# Patient Record
Sex: FEMALE | Race: WHITE | NOT HISPANIC OR LATINO
[De-identification: names, ages, dates, MRNs, and addresses within clinical notes are randomized per-mention and may not be internally consistent; named-entity substitution may affect disease eponyms.]

---

## 2018-11-14 PROBLEM — Z00.00 ENCOUNTER FOR PREVENTIVE HEALTH EXAMINATION: Status: ACTIVE | Noted: 2018-11-14

## 2018-12-05 ENCOUNTER — APPOINTMENT (OUTPATIENT)
Dept: BARIATRICS | Facility: CLINIC | Age: 28
End: 2018-12-05
Payer: COMMERCIAL

## 2018-12-05 VITALS
OXYGEN SATURATION: 99 % | WEIGHT: 264 LBS | TEMPERATURE: 98.2 F | DIASTOLIC BLOOD PRESSURE: 83 MMHG | HEART RATE: 89 BPM | SYSTOLIC BLOOD PRESSURE: 138 MMHG | BODY MASS INDEX: 51.83 KG/M2 | HEIGHT: 60 IN

## 2018-12-05 PROCEDURE — 99203 OFFICE O/P NEW LOW 30 MIN: CPT

## 2019-01-03 ENCOUNTER — APPOINTMENT (OUTPATIENT)
Dept: BARIATRICS | Facility: CLINIC | Age: 29
End: 2019-01-03

## 2019-01-03 ENCOUNTER — APPOINTMENT (OUTPATIENT)
Dept: BARIATRICS | Facility: CLINIC | Age: 29
End: 2019-01-03
Payer: COMMERCIAL

## 2019-01-03 VITALS — HEIGHT: 60 IN | WEIGHT: 260.38 LBS | BODY MASS INDEX: 51.12 KG/M2

## 2019-01-03 DIAGNOSIS — E28.2 POLYCYSTIC OVARIAN SYNDROME: ICD-10-CM

## 2019-01-03 DIAGNOSIS — E66.01 MORBID (SEVERE) OBESITY DUE TO EXCESS CALORIES: ICD-10-CM

## 2019-01-03 PROCEDURE — 97802 MEDICAL NUTRITION INDIV IN: CPT

## 2019-01-07 PROBLEM — E66.01 MORBID OBESITY DUE TO EXCESS CALORIES: Status: ACTIVE | Noted: 2018-12-05

## 2019-01-07 PROBLEM — E28.2 PCOS (POLYCYSTIC OVARIAN SYNDROME): Status: ACTIVE | Noted: 2018-12-05

## 2019-04-11 ENCOUNTER — OTHER (OUTPATIENT)
Age: 29
End: 2019-04-11

## 2019-04-24 ENCOUNTER — APPOINTMENT (OUTPATIENT)
Dept: BARIATRICS | Facility: CLINIC | Age: 29
End: 2019-04-24
Payer: COMMERCIAL

## 2019-04-24 ENCOUNTER — TRANSCRIPTION ENCOUNTER (OUTPATIENT)
Age: 29
End: 2019-04-24

## 2019-04-24 VITALS
SYSTOLIC BLOOD PRESSURE: 140 MMHG | BODY MASS INDEX: 50.92 KG/M2 | DIASTOLIC BLOOD PRESSURE: 86 MMHG | HEIGHT: 60 IN | HEART RATE: 65 BPM | WEIGHT: 259.38 LBS | OXYGEN SATURATION: 96 % | TEMPERATURE: 96.6 F

## 2019-04-24 PROCEDURE — 99213 OFFICE O/P EST LOW 20 MIN: CPT

## 2019-04-24 NOTE — PHYSICAL EXAM
[Obese, well nourished, in no acute distress] : obese, well nourished, in no acute distress [Normal] : affect appropriate [de-identified] : i [de-identified] : mmm, good color

## 2019-04-24 NOTE — HISTORY OF PRESENT ILLNESS
[de-identified] : 30 yo female for final visit for lap vsg.  she is feelign well today.  nutrition quiz taken. has lost weight .  no change in med hx. Denies fever, chills, n/v, cp, sob, abd pain, heartburn, diarrhea, constipation, lightheadedness, dizziness, calf pain, HA , hx of thrombosis.  denies nsaids

## 2019-04-24 NOTE — ASSESSMENT
[FreeTextEntry1] : Patient understands the need for early ambulation to prevent thrombosis as well as the need for adequate hydration.  Nutrition quiz reviewed today, understands periop instructions as well as the risks and benefits of the surgery.

## 2019-04-24 NOTE — REVIEW OF SYSTEMS
[Recent Change In Weight] : ~T recent weight change [Negative] : Psychiatric [Fever] : no fever [Chills] : no chills

## 2019-04-25 VITALS
HEART RATE: 67 BPM | SYSTOLIC BLOOD PRESSURE: 129 MMHG | DIASTOLIC BLOOD PRESSURE: 60 MMHG | OXYGEN SATURATION: 98 % | HEIGHT: 61 IN | RESPIRATION RATE: 18 BRPM | WEIGHT: 255.3 LBS | TEMPERATURE: 98 F

## 2019-04-29 ENCOUNTER — RESULT REVIEW (OUTPATIENT)
Age: 29
End: 2019-04-29

## 2019-04-29 ENCOUNTER — INPATIENT (INPATIENT)
Facility: HOSPITAL | Age: 29
LOS: 1 days | Discharge: HOME | DRG: 621 | End: 2019-05-01
Attending: SURGERY | Admitting: SURGERY
Payer: COMMERCIAL

## 2019-04-29 ENCOUNTER — APPOINTMENT (OUTPATIENT)
Dept: BARIATRICS | Facility: HOSPITAL | Age: 29
End: 2019-04-29

## 2019-04-29 LAB
BLD GP AB SCN SERPL QL: NEGATIVE — SIGNIFICANT CHANGE UP
HCT VFR BLD CALC: 42.5 % — SIGNIFICANT CHANGE UP (ref 34.5–45)
HGB BLD-MCNC: 13.6 G/DL — SIGNIFICANT CHANGE UP (ref 11.5–15.5)
MCHC RBC-ENTMCNC: 25.3 PG — LOW (ref 27–34)
MCHC RBC-ENTMCNC: 32 GM/DL — SIGNIFICANT CHANGE UP (ref 32–36)
MCV RBC AUTO: 79.1 FL — LOW (ref 80–100)
NRBC # BLD: 0 /100 WBCS — SIGNIFICANT CHANGE UP (ref 0–0)
PLATELET # BLD AUTO: 202 K/UL — SIGNIFICANT CHANGE UP (ref 150–400)
RBC # BLD: 5.37 M/UL — HIGH (ref 3.8–5.2)
RBC # FLD: 14.5 % — SIGNIFICANT CHANGE UP (ref 10.3–14.5)
RH IG SCN BLD-IMP: NEGATIVE — SIGNIFICANT CHANGE UP
WBC # BLD: 6.73 K/UL — SIGNIFICANT CHANGE UP (ref 3.8–10.5)
WBC # FLD AUTO: 6.73 K/UL — SIGNIFICANT CHANGE UP (ref 3.8–10.5)

## 2019-04-29 PROCEDURE — 43775 LAP SLEEVE GASTRECTOMY: CPT

## 2019-04-29 RX ORDER — ACETAMINOPHEN 500 MG
1000 TABLET ORAL ONCE
Qty: 0 | Refills: 0 | Status: DISCONTINUED | OUTPATIENT
Start: 2019-04-29 | End: 2019-04-29

## 2019-04-29 RX ORDER — GABAPENTIN 400 MG/1
300 CAPSULE ORAL ONCE
Qty: 0 | Refills: 0 | Status: COMPLETED | OUTPATIENT
Start: 2019-04-29 | End: 2019-04-29

## 2019-04-29 RX ORDER — CEFAZOLIN SODIUM 1 G
2000 VIAL (EA) INJECTION EVERY 8 HOURS
Qty: 0 | Refills: 0 | Status: COMPLETED | OUTPATIENT
Start: 2019-04-29 | End: 2019-04-30

## 2019-04-29 RX ORDER — METHIMAZOLE 10 MG/1
0 TABLET ORAL
Qty: 0 | Refills: 0 | COMMUNITY

## 2019-04-29 RX ORDER — KETOROLAC TROMETHAMINE 30 MG/ML
30 SYRINGE (ML) INJECTION ONCE
Qty: 0 | Refills: 0 | Status: DISCONTINUED | OUTPATIENT
Start: 2019-04-29 | End: 2019-04-29

## 2019-04-29 RX ORDER — HYDROMORPHONE HYDROCHLORIDE 2 MG/ML
0.5 INJECTION INTRAMUSCULAR; INTRAVENOUS; SUBCUTANEOUS ONCE
Qty: 0 | Refills: 0 | Status: DISCONTINUED | OUTPATIENT
Start: 2019-04-29 | End: 2019-04-29

## 2019-04-29 RX ORDER — ACETAMINOPHEN 500 MG
1000 TABLET ORAL ONCE
Qty: 0 | Refills: 0 | Status: COMPLETED | OUTPATIENT
Start: 2019-04-29 | End: 2019-04-29

## 2019-04-29 RX ORDER — CEFAZOLIN SODIUM 1 G
2000 VIAL (EA) INJECTION EVERY 8 HOURS
Qty: 0 | Refills: 0 | Status: DISCONTINUED | OUTPATIENT
Start: 2019-04-29 | End: 2019-04-29

## 2019-04-29 RX ORDER — PANTOPRAZOLE SODIUM 20 MG/1
40 TABLET, DELAYED RELEASE ORAL DAILY
Qty: 0 | Refills: 0 | Status: DISCONTINUED | OUTPATIENT
Start: 2019-04-29 | End: 2019-05-01

## 2019-04-29 RX ORDER — HYDROMORPHONE HYDROCHLORIDE 2 MG/ML
0.5 INJECTION INTRAMUSCULAR; INTRAVENOUS; SUBCUTANEOUS EVERY 6 HOURS
Qty: 0 | Refills: 0 | Status: DISCONTINUED | OUTPATIENT
Start: 2019-04-29 | End: 2019-04-30

## 2019-04-29 RX ORDER — ENOXAPARIN SODIUM 100 MG/ML
40 INJECTION SUBCUTANEOUS ONCE
Qty: 0 | Refills: 0 | Status: COMPLETED | OUTPATIENT
Start: 2019-04-29 | End: 2019-04-29

## 2019-04-29 RX ORDER — SCOPALAMINE 1 MG/3D
1 PATCH, EXTENDED RELEASE TRANSDERMAL ONCE
Qty: 0 | Refills: 0 | Status: COMPLETED | OUTPATIENT
Start: 2019-04-29 | End: 2019-04-29

## 2019-04-29 RX ORDER — SODIUM CHLORIDE 9 MG/ML
1000 INJECTION, SOLUTION INTRAVENOUS
Qty: 0 | Refills: 0 | Status: DISCONTINUED | OUTPATIENT
Start: 2019-04-29 | End: 2019-04-30

## 2019-04-29 RX ORDER — ACETAMINOPHEN 500 MG
650 TABLET ORAL EVERY 6 HOURS
Qty: 0 | Refills: 0 | Status: DISCONTINUED | OUTPATIENT
Start: 2019-04-29 | End: 2019-04-30

## 2019-04-29 RX ADMIN — HYDROMORPHONE HYDROCHLORIDE 0.5 MILLIGRAM(S): 2 INJECTION INTRAMUSCULAR; INTRAVENOUS; SUBCUTANEOUS at 16:02

## 2019-04-29 RX ADMIN — GABAPENTIN 300 MILLIGRAM(S): 400 CAPSULE ORAL at 10:01

## 2019-04-29 RX ADMIN — SODIUM CHLORIDE 150 MILLILITER(S): 9 INJECTION, SOLUTION INTRAVENOUS at 17:48

## 2019-04-29 RX ADMIN — HYDROMORPHONE HYDROCHLORIDE 0.5 MILLIGRAM(S): 2 INJECTION INTRAMUSCULAR; INTRAVENOUS; SUBCUTANEOUS at 21:50

## 2019-04-29 RX ADMIN — PANTOPRAZOLE SODIUM 40 MILLIGRAM(S): 20 TABLET, DELAYED RELEASE ORAL at 16:02

## 2019-04-29 RX ADMIN — HYDROMORPHONE HYDROCHLORIDE 0.5 MILLIGRAM(S): 2 INJECTION INTRAMUSCULAR; INTRAVENOUS; SUBCUTANEOUS at 16:15

## 2019-04-29 RX ADMIN — Medication 1000 MILLIGRAM(S): at 10:00

## 2019-04-29 RX ADMIN — SCOPALAMINE 1 PATCH: 1 PATCH, EXTENDED RELEASE TRANSDERMAL at 10:02

## 2019-04-29 RX ADMIN — Medication 2000 MILLIGRAM(S): at 21:19

## 2019-04-29 RX ADMIN — ENOXAPARIN SODIUM 40 MILLIGRAM(S): 100 INJECTION SUBCUTANEOUS at 10:01

## 2019-04-29 RX ADMIN — Medication 650 MILLIGRAM(S): at 19:47

## 2019-04-29 RX ADMIN — HYDROMORPHONE HYDROCHLORIDE 0.5 MILLIGRAM(S): 2 INJECTION INTRAMUSCULAR; INTRAVENOUS; SUBCUTANEOUS at 21:19

## 2019-04-29 NOTE — PROGRESS NOTE ADULT - SUBJECTIVE AND OBJECTIVE BOX
Team 2 Surgery Post-Op Note    Procedure: Laparoscopic sleeve gastrectomy      Subjective: Patient endorses mild nausea and incisional pain    Vital Signs Last 24 Hrs  T(C): 36.7 (29 Apr 2019 14:52), Max: 36.7 (29 Apr 2019 14:52)  T(F): 98 (29 Apr 2019 14:52), Max: 98 (29 Apr 2019 14:52)  HR: 52 (29 Apr 2019 17:21) (50 - 82)  BP: 154/73 (29 Apr 2019 17:21) (142/66 - 187/87)  BP(mean): 108 (29 Apr 2019 17:00) (89 - 118)  RR: 18 (29 Apr 2019 17:21) (16 - 19)  SpO2: 100% (29 Apr 2019 17:21) (99% - 100%)    Physical Exam:  General: NAD, resting comfortably in bed  Pulmonary: Nonlabored breathing, no respiratory distress  Cardiovascular: No heaves or thrills  Abdominal: Soft, obese, nondistended, appropriately tender, incisions CDI  Extremities: WWP, no cyanosis  Neuro: A/O x 3      LABS:            CAPILLARY BLOOD GLUCOSE            ABO Interpretation: O (04-29 @ 09:51)          Assessment:29y Female s/p above procedure    Plan:  Pain/nausea control IV PRN  Home meds  Incentive spirometer/OOB/Ambulate  BCLD/IVF  Postop labs in 4 hours  TOV in 8 hours  AM labs

## 2019-04-30 ENCOUNTER — TRANSCRIPTION ENCOUNTER (OUTPATIENT)
Age: 29
End: 2019-04-30

## 2019-04-30 LAB
ANION GAP SERPL CALC-SCNC: 14 MMOL/L — SIGNIFICANT CHANGE UP (ref 5–17)
BUN SERPL-MCNC: 10 MG/DL — SIGNIFICANT CHANGE UP (ref 7–23)
CALCIUM SERPL-MCNC: 9.2 MG/DL — SIGNIFICANT CHANGE UP (ref 8.4–10.5)
CHLORIDE SERPL-SCNC: 105 MMOL/L — SIGNIFICANT CHANGE UP (ref 96–108)
CO2 SERPL-SCNC: 17 MMOL/L — LOW (ref 22–31)
CREAT SERPL-MCNC: 0.66 MG/DL — SIGNIFICANT CHANGE UP (ref 0.5–1.3)
GLUCOSE SERPL-MCNC: 124 MG/DL — HIGH (ref 70–99)
HCT VFR BLD CALC: 40 % — SIGNIFICANT CHANGE UP (ref 34.5–45)
HGB BLD-MCNC: 12.7 G/DL — SIGNIFICANT CHANGE UP (ref 11.5–15.5)
MAGNESIUM SERPL-MCNC: 1.8 MG/DL — SIGNIFICANT CHANGE UP (ref 1.6–2.6)
MCHC RBC-ENTMCNC: 25.3 PG — LOW (ref 27–34)
MCHC RBC-ENTMCNC: 31.8 GM/DL — LOW (ref 32–36)
MCV RBC AUTO: 79.8 FL — LOW (ref 80–100)
NRBC # BLD: 0 /100 WBCS — SIGNIFICANT CHANGE UP (ref 0–0)
PHOSPHATE SERPL-MCNC: 4.3 MG/DL — SIGNIFICANT CHANGE UP (ref 2.5–4.5)
PLATELET # BLD AUTO: 192 K/UL — SIGNIFICANT CHANGE UP (ref 150–400)
POTASSIUM SERPL-MCNC: 4.3 MMOL/L — SIGNIFICANT CHANGE UP (ref 3.5–5.3)
POTASSIUM SERPL-SCNC: 4.3 MMOL/L — SIGNIFICANT CHANGE UP (ref 3.5–5.3)
RBC # BLD: 5.01 M/UL — SIGNIFICANT CHANGE UP (ref 3.8–5.2)
RBC # FLD: 14.4 % — SIGNIFICANT CHANGE UP (ref 10.3–14.5)
SODIUM SERPL-SCNC: 136 MMOL/L — SIGNIFICANT CHANGE UP (ref 135–145)
WBC # BLD: 5.16 K/UL — SIGNIFICANT CHANGE UP (ref 3.8–10.5)
WBC # FLD AUTO: 5.16 K/UL — SIGNIFICANT CHANGE UP (ref 3.8–10.5)

## 2019-04-30 RX ORDER — OXYCODONE AND ACETAMINOPHEN 5; 325 MG/1; MG/1
2 TABLET ORAL EVERY 4 HOURS
Qty: 0 | Refills: 0 | Status: DISCONTINUED | OUTPATIENT
Start: 2019-04-30 | End: 2019-05-01

## 2019-04-30 RX ORDER — ACETAMINOPHEN 500 MG
1000 TABLET ORAL ONCE
Qty: 0 | Refills: 0 | Status: COMPLETED | OUTPATIENT
Start: 2019-04-30 | End: 2019-04-30

## 2019-04-30 RX ORDER — OXYCODONE AND ACETAMINOPHEN 5; 325 MG/1; MG/1
1 TABLET ORAL EVERY 4 HOURS
Qty: 0 | Refills: 0 | Status: DISCONTINUED | OUTPATIENT
Start: 2019-04-30 | End: 2019-05-01

## 2019-04-30 RX ORDER — ONDANSETRON 8 MG/1
4 TABLET, FILM COATED ORAL EVERY 6 HOURS
Qty: 0 | Refills: 0 | Status: DISCONTINUED | OUTPATIENT
Start: 2019-04-30 | End: 2019-05-01

## 2019-04-30 RX ORDER — SODIUM CHLORIDE 9 MG/ML
1000 INJECTION, SOLUTION INTRAVENOUS
Qty: 0 | Refills: 0 | Status: DISCONTINUED | OUTPATIENT
Start: 2019-04-30 | End: 2019-05-01

## 2019-04-30 RX ORDER — MAGNESIUM SULFATE 500 MG/ML
2 VIAL (ML) INJECTION ONCE
Qty: 0 | Refills: 0 | Status: COMPLETED | OUTPATIENT
Start: 2019-04-30 | End: 2019-04-30

## 2019-04-30 RX ORDER — KETOROLAC TROMETHAMINE 30 MG/ML
30 SYRINGE (ML) INJECTION EVERY 6 HOURS
Qty: 0 | Refills: 0 | Status: DISCONTINUED | OUTPATIENT
Start: 2019-04-30 | End: 2019-05-01

## 2019-04-30 RX ADMIN — Medication 2000 MILLIGRAM(S): at 14:31

## 2019-04-30 RX ADMIN — Medication 400 MILLIGRAM(S): at 10:58

## 2019-04-30 RX ADMIN — SCOPALAMINE 1 PATCH: 1 PATCH, EXTENDED RELEASE TRANSDERMAL at 18:11

## 2019-04-30 RX ADMIN — ONDANSETRON 4 MILLIGRAM(S): 8 TABLET, FILM COATED ORAL at 11:47

## 2019-04-30 RX ADMIN — SODIUM CHLORIDE 100 MILLILITER(S): 9 INJECTION, SOLUTION INTRAVENOUS at 10:53

## 2019-04-30 RX ADMIN — Medication 2000 MILLIGRAM(S): at 05:13

## 2019-04-30 RX ADMIN — Medication 30 MILLIGRAM(S): at 17:52

## 2019-04-30 RX ADMIN — Medication 400 MILLIGRAM(S): at 03:17

## 2019-04-30 RX ADMIN — OXYCODONE AND ACETAMINOPHEN 1 TABLET(S): 5; 325 TABLET ORAL at 21:12

## 2019-04-30 RX ADMIN — Medication 30 MILLIGRAM(S): at 12:05

## 2019-04-30 RX ADMIN — OXYCODONE AND ACETAMINOPHEN 1 TABLET(S): 5; 325 TABLET ORAL at 22:00

## 2019-04-30 RX ADMIN — Medication 1000 MILLIGRAM(S): at 11:20

## 2019-04-30 RX ADMIN — Medication 30 MILLIGRAM(S): at 11:48

## 2019-04-30 RX ADMIN — Medication 1000 MILLIGRAM(S): at 04:00

## 2019-04-30 RX ADMIN — Medication 50 GRAM(S): at 11:30

## 2019-04-30 RX ADMIN — Medication 30 MILLIGRAM(S): at 23:29

## 2019-04-30 RX ADMIN — SCOPALAMINE 1 PATCH: 1 PATCH, EXTENDED RELEASE TRANSDERMAL at 06:36

## 2019-04-30 RX ADMIN — Medication 30 MILLIGRAM(S): at 18:10

## 2019-04-30 RX ADMIN — PANTOPRAZOLE SODIUM 40 MILLIGRAM(S): 20 TABLET, DELAYED RELEASE ORAL at 11:47

## 2019-04-30 NOTE — DISCHARGE NOTE PROVIDER - NSDCCPGOAL_GEN_ALL_CORE_FT
To get better and follow your care plan as instructed.  1)  Please take Tylenol 650 mg every 4 to 6 hours by mouth for moderate pain control.   2) Please take Percocet 1 to 2 tabs by mouth every 4 to 6 hours as needed for severe pain control.  3) Please start taking Eliquis Thursday May 2, 2019 2.5 mg twice a day by mouth for 30 days.   4) Please take Omeprazole 40 mg once a day by mouth.

## 2019-04-30 NOTE — DIETITIAN INITIAL EVALUATION ADULT. - ENERGY NEEDS
Height: 5'1" Weight: 255lbs, IBW 105lbs+/-10%, %%, BMI 48.2  IBW used for calculations as pt >120% of IBW   Above energy needs calculated for wt maintenance (20-25kcal/kg).   Weeks 1-2 estimated needs: 476-571kcal/day (10-12kcal/kg), 86-120g pro/day (1.5-2.1g/kg), >/=64oz clear fluids.

## 2019-04-30 NOTE — DIETITIAN INITIAL EVALUATION ADULT. - OTHER INFO
30yo F s/p lap sleeve gastrectomy POD1. Pt  seen in room, resting in bed. Currently on a BARICLLIQ diet and tolerating PO. Sipping broth and tea, suspect ~2oz/hr. Pain controlled. C/o nausea- RN and team aware. Prepared w/ appropriate protein and vitamin supplements. RD provided indepth edu on diet advancement processs and nutrient needs s/p LSG. Pt receptive and expressed understanding. NKFA or dietary restrictions. Skin: surgical incision; GI WDL per flowsheet.

## 2019-04-30 NOTE — PROGRESS NOTE ADULT - ASSESSMENT
28 yo F, w/ hx of hyperthyroidism s/p laparoscopic sleeve gastrectomy 4/29    -Pain/nausea control w/ Tylenol and Toradol  -BCLD/IVF  -Cefazolin x3 doses  -Eliquis 2.5mg BID on POD3  -OOBA/SCDs/IS  -AM labs      Patient examined and plan discussed with Dr. Oliver

## 2019-04-30 NOTE — DISCHARGE NOTE PROVIDER - HOSPITAL COURSE
28 yo F, w/ hx of hyperthyroidism, presenting for elective laparoscopic sleeve gastrectomy. 4/29: Laparoscopic sleeve gastrectomy. Pt tolerated the procedure well. . At time of discharge, pt was tolerating a bariatric clear liquid diet, and pt's pain was controlled. Plan is to follow up with Dr. Oliver  in the office.

## 2019-04-30 NOTE — DISCHARGE NOTE PROVIDER - NSDCACTIVITY_GEN_ALL_CORE
Stairs allowed/Walking - Indoors allowed/No heavy lifting/straining/Showering allowed/Walking - Outdoors allowed

## 2019-04-30 NOTE — DISCHARGE NOTE PROVIDER - CARE PROVIDER_API CALL
Jeremy Oliver (MD)  Surgery  [address] [place] [address], [place] [address]  Phone: [phone]  Fax: [phone]  Follow Up Time: 1 week

## 2019-04-30 NOTE — DISCHARGE NOTE PROVIDER - NSDCFUADDINST_GEN_ALL_CORE_FT
1)  Please take Tylenol 650 mg every 4 to 6 hours by mouth for moderate pain control.   2) Please take Percocet 1 to 2 tabs by mouth every 4 to 6 hours as needed for severe pain control.  3) Please start taking Eliquis Thursday May 2, 2019 2.5 mg twice a day by mouth for 30 days.   4) Please take Omeprazole 40 mg once a day by mouth.      Follow up with Dr. Oliver in 1 week. Call the office at  to schedule your appointment. You may shower; soap and water over incision sites. Do not scrub. Pat dry when done. No tub bathing or swimming until cleared. Keep incision sites out of the sun as scars will darken. No heavy lifting (>10lbs) or strenuous exercise. Diet: Bariatric Full Fluids. 60 grams protein daily.  64 fluid ounces water daily. Drink small sips throughout the day. Continue diet as outlined by paperwork received as a pre-operative patient. You should be urinating at least 3-4x per day. Call the office if you experience increasing abdominal pain, nausea, vomiting, or temperature >100.4F.  NO ASPIRIN OR NSAIDs until approved by Dr. Oliver. Avoid alcoholic beverages until cleared by Dr. Oliver.

## 2019-04-30 NOTE — PROGRESS NOTE ADULT - SUBJECTIVE AND OBJECTIVE BOX
OVERNIGHT EVENTS: pain controlled, post op h/h: 13.6/42.5, passed TOV (600cc), using IS, OOBA  4/29: S/p LSG, POC wnl     STATUS POST:  Laparoscopic sleeve gastrectomy   POST OPERATIVE DAY #: 1    SUBJECTIVE: Patient examined bedside with Dr. Oliver. Patient complains of incisional pain. Patient denies nausea, vomiting , SOB and chest pain. Patient making adequate urine output.      ceFAZolin  Injectable. 2000 milliGRAM(s) IV Push every 8 hours      Vital Signs Last 24 Hrs  T(C): 36.9 (30 Apr 2019 05:09), Max: 37.4 (29 Apr 2019 20:20)  T(F): 98.4 (30 Apr 2019 05:09), Max: 99.3 (29 Apr 2019 20:20)  HR: 87 (30 Apr 2019 05:09) (50 - 87)  BP: 154/90 (30 Apr 2019 05:09) (115/77 - 187/87)  BP(mean): 108 (29 Apr 2019 17:00) (89 - 118)  RR: 18 (30 Apr 2019 05:09) (16 - 19)  SpO2: 98% (30 Apr 2019 05:09) (96% - 100%)  I&O's Detail    29 Apr 2019 07:01  -  30 Apr 2019 07:00  --------------------------------------------------------  IN:    lactated ringers.: 2400 mL  Total IN: 2400 mL    OUT:    Voided: 1200 mL  Total OUT: 1200 mL    Total NET: 1200 mL          General: NAD, resting comfortably in bed  C/V: NSR  Pulm: Nonlabored breathing, no respiratory distress  Abd: Soft, non-distended, TTP around incision site, incision clean dry and intact.  Extrem: WWP, no edema, SCDs in place      LABS:                        12.7   5.16  )-----------( 192      ( 30 Apr 2019 06:25 )             40.0     04-30    136  |  105  |  10  ----------------------------<  124<H>  4.3   |  17<L>  |  0.66    Ca    9.2      30 Apr 2019 06:24  Phos  4.3     04-30  Mg     1.8     04-30            RADIOLOGY & ADDITIONAL STUDIES:

## 2019-05-01 ENCOUNTER — TRANSCRIPTION ENCOUNTER (OUTPATIENT)
Age: 29
End: 2019-05-01

## 2019-05-01 VITALS
DIASTOLIC BLOOD PRESSURE: 75 MMHG | RESPIRATION RATE: 17 BRPM | OXYGEN SATURATION: 98 % | SYSTOLIC BLOOD PRESSURE: 110 MMHG | HEART RATE: 55 BPM | TEMPERATURE: 97 F

## 2019-05-01 LAB
ANION GAP SERPL CALC-SCNC: 12 MMOL/L — SIGNIFICANT CHANGE UP (ref 5–17)
BUN SERPL-MCNC: 13 MG/DL — SIGNIFICANT CHANGE UP (ref 7–23)
CALCIUM SERPL-MCNC: 9 MG/DL — SIGNIFICANT CHANGE UP (ref 8.4–10.5)
CHLORIDE SERPL-SCNC: 107 MMOL/L — SIGNIFICANT CHANGE UP (ref 96–108)
CO2 SERPL-SCNC: 21 MMOL/L — LOW (ref 22–31)
CREAT SERPL-MCNC: 0.78 MG/DL — SIGNIFICANT CHANGE UP (ref 0.5–1.3)
GLUCOSE SERPL-MCNC: 82 MG/DL — SIGNIFICANT CHANGE UP (ref 70–99)
HCT VFR BLD CALC: 36.9 % — SIGNIFICANT CHANGE UP (ref 34.5–45)
HGB BLD-MCNC: 11.8 G/DL — SIGNIFICANT CHANGE UP (ref 11.5–15.5)
MAGNESIUM SERPL-MCNC: 2.1 MG/DL — SIGNIFICANT CHANGE UP (ref 1.6–2.6)
MCHC RBC-ENTMCNC: 25.5 PG — LOW (ref 27–34)
MCHC RBC-ENTMCNC: 32 GM/DL — SIGNIFICANT CHANGE UP (ref 32–36)
MCV RBC AUTO: 79.9 FL — LOW (ref 80–100)
NRBC # BLD: 0 /100 WBCS — SIGNIFICANT CHANGE UP (ref 0–0)
PHOSPHATE SERPL-MCNC: 3.6 MG/DL — SIGNIFICANT CHANGE UP (ref 2.5–4.5)
PLATELET # BLD AUTO: 181 K/UL — SIGNIFICANT CHANGE UP (ref 150–400)
POTASSIUM SERPL-MCNC: 4 MMOL/L — SIGNIFICANT CHANGE UP (ref 3.5–5.3)
POTASSIUM SERPL-SCNC: 4 MMOL/L — SIGNIFICANT CHANGE UP (ref 3.5–5.3)
RBC # BLD: 4.62 M/UL — SIGNIFICANT CHANGE UP (ref 3.8–5.2)
RBC # FLD: 14.7 % — HIGH (ref 10.3–14.5)
SODIUM SERPL-SCNC: 140 MMOL/L — SIGNIFICANT CHANGE UP (ref 135–145)
WBC # BLD: 6.61 K/UL — SIGNIFICANT CHANGE UP (ref 3.8–10.5)
WBC # FLD AUTO: 6.61 K/UL — SIGNIFICANT CHANGE UP (ref 3.8–10.5)

## 2019-05-01 PROCEDURE — 36415 COLL VENOUS BLD VENIPUNCTURE: CPT

## 2019-05-01 PROCEDURE — 85027 COMPLETE CBC AUTOMATED: CPT

## 2019-05-01 PROCEDURE — 80048 BASIC METABOLIC PNL TOTAL CA: CPT

## 2019-05-01 PROCEDURE — 86850 RBC ANTIBODY SCREEN: CPT

## 2019-05-01 PROCEDURE — 83735 ASSAY OF MAGNESIUM: CPT

## 2019-05-01 PROCEDURE — 84100 ASSAY OF PHOSPHORUS: CPT

## 2019-05-01 PROCEDURE — 88307 TISSUE EXAM BY PATHOLOGIST: CPT

## 2019-05-01 PROCEDURE — 86900 BLOOD TYPING SEROLOGIC ABO: CPT

## 2019-05-01 PROCEDURE — 86901 BLOOD TYPING SEROLOGIC RH(D): CPT

## 2019-05-01 RX ORDER — APIXABAN 2.5 MG/1
1 TABLET, FILM COATED ORAL
Qty: 60 | Refills: 0 | OUTPATIENT
Start: 2019-05-01 | End: 2019-05-30

## 2019-05-01 RX ORDER — OMEPRAZOLE 10 MG/1
1 CAPSULE, DELAYED RELEASE ORAL
Qty: 30 | Refills: 0 | OUTPATIENT
Start: 2019-05-01 | End: 2019-05-30

## 2019-05-01 RX ADMIN — PANTOPRAZOLE SODIUM 40 MILLIGRAM(S): 20 TABLET, DELAYED RELEASE ORAL at 11:20

## 2019-05-01 RX ADMIN — SCOPALAMINE 1 PATCH: 1 PATCH, EXTENDED RELEASE TRANSDERMAL at 07:22

## 2019-05-01 RX ADMIN — Medication 30 MILLIGRAM(S): at 05:12

## 2019-05-01 RX ADMIN — Medication 30 MILLIGRAM(S): at 11:35

## 2019-05-01 RX ADMIN — Medication 30 MILLIGRAM(S): at 06:00

## 2019-05-01 RX ADMIN — Medication 30 MILLIGRAM(S): at 11:20

## 2019-05-01 RX ADMIN — Medication 30 MILLIGRAM(S): at 00:00

## 2019-05-01 NOTE — DISCHARGE NOTE NURSING/CASE MANAGEMENT/SOCIAL WORK - NSDCDPATPORTLINK_GEN_ALL_CORE
You can access the [facility] Portal, offered by [facility], by registering with the following website: [email or web address]

## 2019-05-01 NOTE — PROGRESS NOTE ADULT - ASSESSMENT
30 yo F, w/ hx of hyperthyroidism s/p laparoscopic sleeve gastrectomy 4/29    -Pain/nausea control w/ Tylenol and Toradol  -BCLD/IVF  -Cefazolin x3 doses  -Eliquis 2.5mg BID on POD3  -OOBA/SCDs/IS  -AM labs

## 2019-05-01 NOTE — PROGRESS NOTE ADULT - SUBJECTIVE AND OBJECTIVE BOX
OVERNIGHT EVENTS: pt states she is doing better, 2 oz per/hr, ambulating, VSS  4/30: c/o nausea , minimal po intake  Pain well controlled , VSS     STATUS POST:  Laparoscopic sleeve gastrectomy     POST OPERATIVE DAY #: 2    SUBJECTIVE: Patient examined bedside with Dr. Oliver. Patient reports pain improved and tolerating diet. Patient denies nausea, vomiting , SOB and chest pain. Patient making adequate urine output          Vital Signs Last 24 Hrs  T(C): 36.2 (01 May 2019 05:45), Max: 37.3 (30 Apr 2019 17:27)  T(F): 97.2 (01 May 2019 05:45), Max: 99.1 (30 Apr 2019 17:27)  HR: 56 (01 May 2019 05:45) (56 - 70)  BP: 115/57 (01 May 2019 05:45) (104/62 - 151/73)  BP(mean): --  RR: 17 (01 May 2019 05:45) (16 - 18)  SpO2: 98% (01 May 2019 05:45) (97% - 98%)  I&O's Detail    30 Apr 2019 07:01  -  01 May 2019 07:00  --------------------------------------------------------  IN:    IV PiggyBack: 150 mL    lactated ringers.: 300 mL    lactated ringers.: 2170 mL    Oral Fluid: 450 mL  Total IN: 3070 mL    OUT:    Voided: 1350 mL  Total OUT: 1350 mL    Total NET: 1720 mL          General: NAD, resting comfortably in bed  C/V: NSR  Pulm: Nonlabored breathing, no respiratory distress  Abd: Soft, non-distended, TTP around incision site, incision clean dry and intact.  Extrem: WWP, no edema, SCDs in place        LABS:                        11.8   6.61  )-----------( 181      ( 01 May 2019 06:07 )             36.9     05-01    140  |  107  |  13  ----------------------------<  82  4.0   |  21<L>  |  0.78    Ca    9.0      01 May 2019 06:07  Phos  3.6     05-01  Mg     2.1     05-01            RADIOLOGY & ADDITIONAL STUDIES:

## 2019-05-02 ENCOUNTER — OTHER (OUTPATIENT)
Age: 29
End: 2019-05-02

## 2019-05-02 LAB — SURGICAL PATHOLOGY STUDY: SIGNIFICANT CHANGE UP

## 2019-05-06 DIAGNOSIS — E28.2 POLYCYSTIC OVARIAN SYNDROME: ICD-10-CM

## 2019-05-06 DIAGNOSIS — E05.90 THYROTOXICOSIS, UNSPECIFIED WITHOUT THYROTOXIC CRISIS OR STORM: ICD-10-CM

## 2019-05-06 DIAGNOSIS — E66.01 MORBID (SEVERE) OBESITY DUE TO EXCESS CALORIES: ICD-10-CM

## 2019-05-08 ENCOUNTER — APPOINTMENT (OUTPATIENT)
Dept: BARIATRICS | Facility: CLINIC | Age: 29
End: 2019-05-08
Payer: COMMERCIAL

## 2019-05-08 VITALS
OXYGEN SATURATION: 98 % | BODY MASS INDEX: 47.54 KG/M2 | WEIGHT: 242.13 LBS | DIASTOLIC BLOOD PRESSURE: 84 MMHG | HEART RATE: 96 BPM | SYSTOLIC BLOOD PRESSURE: 121 MMHG | HEIGHT: 60 IN | TEMPERATURE: 95.7 F

## 2019-05-08 PROCEDURE — 99024 POSTOP FOLLOW-UP VISIT: CPT

## 2019-05-08 NOTE — PHYSICAL EXAM
[Normal] : normoactive bowel sounds, soft and nontender, no hepatosplenomegaly or masses appreciated. Incisions healing appropriately without erythema or drainage [de-identified] : wounds healing well

## 2019-05-08 NOTE — HISTORY OF PRESENT ILLNESS
[de-identified] : s/p lap vsg and having routine post op course\par discussed the need for routine\par also discussed things to make bowels more regular

## 2019-06-03 ENCOUNTER — OTHER (OUTPATIENT)
Age: 29
End: 2019-06-03

## 2019-06-03 RX ORDER — FAMOTIDINE 40 MG/1
40 TABLET, FILM COATED ORAL
Qty: 30 | Refills: 0 | Status: ACTIVE | COMMUNITY
Start: 2019-06-03 | End: 1900-01-01

## 2019-06-10 PROBLEM — E28.2 POLYCYSTIC OVARIAN SYNDROME: Chronic | Status: ACTIVE | Noted: 2019-04-25

## 2019-06-10 PROBLEM — E03.9 HYPOTHYROIDISM, UNSPECIFIED: Chronic | Status: ACTIVE | Noted: 2019-04-25

## 2019-06-19 ENCOUNTER — APPOINTMENT (OUTPATIENT)
Dept: BARIATRICS | Facility: CLINIC | Age: 29
End: 2019-06-19
Payer: COMMERCIAL

## 2019-06-19 VITALS
HEART RATE: 76 BPM | SYSTOLIC BLOOD PRESSURE: 115 MMHG | OXYGEN SATURATION: 98 % | DIASTOLIC BLOOD PRESSURE: 78 MMHG | BODY MASS INDEX: 45.4 KG/M2 | HEIGHT: 60 IN | WEIGHT: 231.25 LBS

## 2019-06-19 DIAGNOSIS — Z98.84 BARIATRIC SURGERY STATUS: ICD-10-CM

## 2019-06-19 DIAGNOSIS — E66.01 MORBID (SEVERE) OBESITY DUE TO EXCESS CALORIES: ICD-10-CM

## 2019-06-19 PROCEDURE — 99024 POSTOP FOLLOW-UP VISIT: CPT

## 2019-06-19 NOTE — ASSESSMENT
[FreeTextEntry1] : 28 yo female about 7 wks s/p lap vsg, vss, tolerating po, on stage 5, moving bowels normall, noc omplaints, taking vitamins

## 2019-06-19 NOTE — HISTORY OF PRESENT ILLNESS
[de-identified] : 28 yo female about 4 wks s/p lap vsg. she is taking mv, b12 and citracal. s till using shakes but eatign solid food.   Patient is getting at least 60 grams of protein and 64 oz of fluids.  Walking every hour.  Denies fever, chills, n/v, cp, sob, abd pain, leg pain, lightheadedness, dizziness, calf pain, HA, heartburn

## 2019-06-19 NOTE — PHYSICAL EXAM
[Obese, well nourished, in no acute distress] : obese, well nourished, in no acute distress [Normal] : affect appropriate [de-identified] : mmm, good color

## 2019-07-24 ENCOUNTER — APPOINTMENT (OUTPATIENT)
Dept: BARIATRICS | Facility: CLINIC | Age: 29
End: 2019-07-24